# Patient Record
Sex: FEMALE | Race: WHITE | Employment: OTHER | URBAN - NONMETROPOLITAN AREA
[De-identification: names, ages, dates, MRNs, and addresses within clinical notes are randomized per-mention and may not be internally consistent; named-entity substitution may affect disease eponyms.]

---

## 2024-08-06 ENCOUNTER — TELEPHONE (OUTPATIENT)
Dept: WOUND CARE | Age: 69
End: 2024-08-06

## 2024-08-23 ENCOUNTER — HOSPITAL ENCOUNTER (OUTPATIENT)
Dept: WOUND CARE | Age: 69
Discharge: HOME | End: 2024-08-23
Attending: NURSE PRACTITIONER
Payer: MEDICARE

## 2024-08-23 VITALS
DIASTOLIC BLOOD PRESSURE: 73 MMHG | BODY MASS INDEX: 36.44 KG/M2 | OXYGEN SATURATION: 93 % | HEIGHT: 62 IN | SYSTOLIC BLOOD PRESSURE: 125 MMHG | HEART RATE: 79 BPM | TEMPERATURE: 98.4 F | WEIGHT: 198 LBS

## 2024-08-23 DIAGNOSIS — Z87.2 HISTORY OF PRESSURE INJURY OF SKIN: Primary | ICD-10-CM

## 2024-08-23 PROBLEM — F25.1 SCHIZOAFFECTIVE DISORDER, DEPRESSIVE TYPE (HCC): Status: ACTIVE | Noted: 2024-08-23

## 2024-08-23 PROBLEM — F17.210 SMOKES 2 PACKS OF CIGARETTES PER DAY: Status: ACTIVE | Noted: 2024-08-23

## 2024-08-23 PROBLEM — I10 ESSENTIAL HYPERTENSION: Status: ACTIVE | Noted: 2018-10-14

## 2024-08-23 PROBLEM — J44.9 COPD (CHRONIC OBSTRUCTIVE PULMONARY DISEASE) (HCC): Status: ACTIVE | Noted: 2018-10-14

## 2024-08-23 PROBLEM — F17.210 SMOKES 2 PACKS OF CIGARETTES PER DAY: Status: RESOLVED | Noted: 2024-08-23 | Resolved: 2024-08-23

## 2024-08-23 PROBLEM — E78.5 HYPERLIPIDEMIA: Status: ACTIVE | Noted: 2019-08-29

## 2024-08-23 PROBLEM — F32.A DEPRESSION: Status: ACTIVE | Noted: 2018-10-14

## 2024-08-23 PROBLEM — E66.9 OBESITY (BMI 30-39.9): Status: ACTIVE | Noted: 2021-07-22

## 2024-08-23 PROCEDURE — 99203 OFFICE O/P NEW LOW 30 MIN: CPT

## 2024-08-23 RX ORDER — ASPIRIN 325 MG
325 TABLET ORAL DAILY
COMMUNITY

## 2024-08-23 RX ORDER — METOPROLOL SUCCINATE 50 MG/1
50 TABLET, EXTENDED RELEASE ORAL DAILY
COMMUNITY

## 2024-08-23 RX ORDER — MELOXICAM 7.5 MG/1
7.5 TABLET ORAL DAILY
COMMUNITY

## 2024-08-23 NOTE — PATIENT INSTRUCTIONS
Visit Discharge/Physician Orders:  - Turn and reposition frequently to help keep pressure off of wound.  - Offloading cushion given today. You can put this in a pillowcase and use in any chair you sit in.  - Once the triad cream is gone then you can use over the counter zinc oxide cream.     Wound Location: Buttocks - healed    Dressing orders:     Buttocks- Apply triad cream daily and as needed.    Follow up visit: As needed    Keep next scheduled appointment. Please give 24 hour notice if unable to keep appointment. [phone]    If you experience any of the following, please call the Wound Care Service during business hours: Monday through Friday 8:00 am - 4:30 pm  ([phone]).   *Increase in pain   *Temperature over 101   *Increase in drainage from your wound or a foul odor   *Uncontrolled swelling   *Need for compression bandage changes due to slippage, breakthrough drainage    If you need medical attention outside of business hours, please contact your Primary Care Doctor or go to the nearest emergency room.

## 2024-08-23 NOTE — ASSESSMENT & PLAN NOTE
Well-controlled, wounds have healed. Education provided on the etiology of pressure injuries and the importance of pressure offloading to promote healing. Pressure offloading techniques reviewed.  Patient has decreased mobility related to breathing difficulties secondary to COPD and oxygen dependency.  Encouraged her to turn and reposition hourly while up in the chair. Waffle cushion provided today to use while up in chair.  Recommend obtaining gel or foam cushion for long term use.  No ring or donut cushions.  Patient does not chronic urinary incontinence, utilizes Depends for this.  Recommend use of barrier cream to help protect from incontinence.  Discussed importance of changing briefs promptly with any incontinence.  No open wounds or drainage noted on exam today indicative of need for any wound care dressings.      Orders:    Apply dressing; Standing    Apply dressing

## 2024-08-23 NOTE — PROGRESS NOTES
ENCOUNTER FOR SCREENING FOR COVID 19 TEST  
utilizes Depends for this.  Recommend use of barrier cream to help protect from incontinence.  Discussed importance of changing briefs promptly with any incontinence.  No open wounds or drainage noted on exam today indicative of need for any wound care dressings.      Orders:    Apply dressing; Standing    Apply dressing    -No indications of infection noted at today's visit.  Education provided on signs and symptoms of infection.  Call clinic or seek emergency care should these occur.    Follow up as needed.    All questions and concerns addressed prior to discharge from today's visit.    Please see attached Discharge Instructions    Written patient dismissal instructions given to patient or POA.           Treatment:   Orders Placed This Encounter   Procedures    Apply dressing     Apply Triad paste to buttocks     Standing Status:   Standing     Number of Occurrences:   1       I spent a total of 40 minutes with Lilly Cotto  on 8/23/2024.  Time spent includes review of previous progress notes, reviewing and discussing test results, education on plan of care for presenting diagnosis, answering questions, providing instructions on treatment and/or medications ordered, reviewing importance of compliance with outline treatment plan and any identifiable barriers to compliance and coordination of care based on plan and assessment as noted.       Discharge Instructions       Patient Instructions   Visit Discharge/Physician Orders:  - Turn and reposition frequently to help keep pressure off of wound.  - Offloading cushion given today. You can put this in a pillowcase and use in any chair you sit in.  - Once the triad cream is gone then you can use over the counter zinc oxide cream.     Wound Location: Buttocks - healed    Dressing orders:     Buttocks- Apply triad cream daily and as needed.    Follow up visit: As needed    Keep next scheduled appointment. Please give 24 hour notice if unable to keep appointment.

## 2024-08-23 NOTE — PLAN OF CARE
Problem: Wound:  Goal: Will show signs of wound healing; wound closure and no evidence of infection  Description: Will show signs of wound healing; wound closure and no evidence of infection  Outcome: Adequate for Discharge   Patient presents to wound clinic for evaluation and treatment of buttock wound. Wound is healed. Discharge instructions/orders per AVS. Follow up as needed.    Care plan reviewed with patient.  Patient verbalize understanding of the plan of care and contribute to goal setting.

## 2025-02-04 ENCOUNTER — TELEPHONE (OUTPATIENT)
Dept: WOUND CARE | Age: 70
End: 2025-02-04

## 2025-02-04 NOTE — TELEPHONE ENCOUNTER
Called patient regarding wound clinic referral to schedule an appointment. Patient declined. She said she has a broken foot and and is unable to get out at the moment and that she has home health assisting her. Called referring office ([facility] - Jaci Aguilar PA-C) and updated them that patient declined scheduling. Referral will be closed.